# Patient Record
Sex: MALE | Race: WHITE | NOT HISPANIC OR LATINO | URBAN - METROPOLITAN AREA
[De-identification: names, ages, dates, MRNs, and addresses within clinical notes are randomized per-mention and may not be internally consistent; named-entity substitution may affect disease eponyms.]

---

## 2020-06-11 ENCOUNTER — OFFICE (OUTPATIENT)
Dept: URBAN - METROPOLITAN AREA CLINIC 9 | Facility: CLINIC | Age: 65
End: 2020-06-11

## 2020-06-11 VITALS
WEIGHT: 152 LBS | SYSTOLIC BLOOD PRESSURE: 146 MMHG | HEART RATE: 74 BPM | TEMPERATURE: 98.1 F | HEIGHT: 72 IN | DIASTOLIC BLOOD PRESSURE: 79 MMHG

## 2020-06-11 DIAGNOSIS — I63.9 CEREBRAL INFARCTION, UNSPECIFIED: ICD-10-CM

## 2020-06-11 DIAGNOSIS — J44.9 CHRONIC OBSTRUCTIVE PULMONARY DISEASE, UNSPECIFIED: ICD-10-CM

## 2020-06-11 DIAGNOSIS — K21.9 GASTRO-ESOPHAGEAL REFLUX DISEASE WITHOUT ESOPHAGITIS: ICD-10-CM

## 2020-06-11 PROCEDURE — 99204 OFFICE O/P NEW MOD 45 MIN: CPT | Performed by: INTERNAL MEDICINE

## 2020-06-11 NOTE — SERVICEHPINOTES
He presents today for colonoscopy screening. He denies abdominal pain, constipation, and diarrhea. He denies hematochezia and melena. He notes he has regular bowel movements daily. Stools are brown and formed. He notes he has intermittent heartburn depending on what he eats. He notes it occurs every 3-4 months. He notes that Tums relieves his symptoms. He notes certain foods aggravate the symptoms. Symptoms last short time until he takes the medication.  He notes he has emphysema/COPD. He notes he uses an inhaler infrequently. He does not see a lung doctor. He notes he doesn't get SOB easily when he is walking without carrying heavy objects. He notes if he is carrying 30 pounds he gets SOB in 250 feet.  He denies problems with his heart including heart palpitations, chest pain, dizziness, and syncope. He notes he has a history of stroke and is on clopidogrel. He notes this is managed by his internal medicine doctor. KARO Alvarado notes his mother began developing colon polyps at 60 years of age.